# Patient Record
Sex: FEMALE | Race: WHITE | NOT HISPANIC OR LATINO | Employment: FULL TIME | URBAN - METROPOLITAN AREA
[De-identification: names, ages, dates, MRNs, and addresses within clinical notes are randomized per-mention and may not be internally consistent; named-entity substitution may affect disease eponyms.]

---

## 2023-04-08 LAB — GROUP B STREP SCREEN: NORMAL

## 2024-10-18 ENCOUNTER — APPOINTMENT (OUTPATIENT)
Dept: DERMATOLOGY | Facility: CLINIC | Age: 34
End: 2024-10-18
Payer: COMMERCIAL

## 2024-11-19 ENCOUNTER — APPOINTMENT (OUTPATIENT)
Dept: DERMATOLOGY | Facility: CLINIC | Age: 34
End: 2024-11-19
Payer: COMMERCIAL

## 2025-02-03 ENCOUNTER — APPOINTMENT (OUTPATIENT)
Dept: DERMATOLOGY | Facility: CLINIC | Age: 35
End: 2025-02-03
Payer: COMMERCIAL

## 2025-02-18 ENCOUNTER — APPOINTMENT (OUTPATIENT)
Dept: OBSTETRICS AND GYNECOLOGY | Facility: CLINIC | Age: 35
End: 2025-02-18
Payer: COMMERCIAL

## 2025-02-18 VITALS — WEIGHT: 135 LBS | BODY MASS INDEX: 22.47 KG/M2 | SYSTOLIC BLOOD PRESSURE: 108 MMHG | DIASTOLIC BLOOD PRESSURE: 62 MMHG

## 2025-02-18 DIAGNOSIS — Z34.81 PRENATAL CARE, SUBSEQUENT PREGNANCY IN FIRST TRIMESTER (HHS-HCC): Primary | ICD-10-CM

## 2025-02-18 DIAGNOSIS — Z3A.01 6 WEEKS GESTATION OF PREGNANCY (HHS-HCC): ICD-10-CM

## 2025-02-18 PROBLEM — Z87.898 HISTORY OF POOR FETAL GROWTH: Status: ACTIVE | Noted: 2025-02-18

## 2025-02-18 PROBLEM — Z86.11 HISTORY OF TREATMENT FOR TUBERCULOSIS: Status: ACTIVE | Noted: 2025-02-18

## 2025-02-18 LAB — PREGNANCY TEST URINE, POC: POSITIVE

## 2025-02-18 PROCEDURE — 81025 URINE PREGNANCY TEST: CPT

## 2025-02-18 PROCEDURE — 0500F INITIAL PRENATAL CARE VISIT: CPT

## 2025-02-18 RX ORDER — CLINDAMYCIN PHOSPHATE 10 UG/ML
LOTION TOPICAL
COMMUNITY
Start: 2017-09-12 | End: 2025-09-25

## 2025-02-18 RX ORDER — ADAPALENE AND BENZOYL PEROXIDE GEL, 0.1%/2.5% 1; 25 MG/G; MG/G
1 GEL TOPICAL
COMMUNITY
Start: 2020-11-18

## 2025-02-18 ASSESSMENT — EDINBURGH POSTNATAL DEPRESSION SCALE (EPDS)
THE THOUGHT OF HARMING MYSELF HAS OCCURRED TO ME: NEVER
I HAVE BEEN ANXIOUS OR WORRIED FOR NO GOOD REASON: YES, SOMETIMES
I HAVE BEEN SO UNHAPPY THAT I HAVE HAD DIFFICULTY SLEEPING: NOT AT ALL
I HAVE FELT SCARED OR PANICKY FOR NO GOOD REASON: NO, NOT AT ALL
I HAVE BEEN ABLE TO LAUGH AND SEE THE FUNNY SIDE OF THINGS: AS MUCH AS I ALWAYS COULD
I HAVE LOOKED FORWARD WITH ENJOYMENT TO THINGS: AS MUCH AS I EVER DID
TOTAL SCORE: 7
I HAVE FELT SAD OR MISERABLE: NOT VERY OFTEN
I HAVE BEEN SO UNHAPPY THAT I HAVE BEEN CRYING: ONLY OCCASIONALLY
I HAVE BLAMED MYSELF UNNECESSARILY WHEN THINGS WENT WRONG: NOT VERY OFTEN
THINGS HAVE BEEN GETTING ON TOP OF ME: YES, SOMETIMES I HAVEN'T BEEN COPING AS WELL AS USUAL

## 2025-02-18 NOTE — PROGRESS NOTES
"Assessment/Plan     Routine Prenatal Care  - Patient appropriate for and desires midwifery service  - Oriented to practice, including available collaboration and consulting with physicians. Pt will likely split prenatal care between  office and either [place] or [facility] as she lives in [place].  - Discussed routine OB labs, including serum STI/HIV, CBC, blood type and screen.   - Education provided r/t nutrition, folic acid supplementation, dietary guidelines, exercise, travel, smoking, alcohol, caffeine, and drug use.  - Dating ultrasound ordered, pt undecided if she will schedule or defer to NT.  - Pt counseled on genetic testing options and provided literature in the obstetric folder. First line screening options, including cffDNA and NT ultrasound, were discussed and offered.  Benefits, drawbacks, and limitations explained. Pt interested in both - orders placed.  - Warning s/s discussed and SAB precautions reviewed. Pt provided on-call number and instructed on when to call.    Nausea During Pregnancy  - Discussed lifestyle modifications including small frequent meals, keeping dry crackers at bedside to eat upon awakening  - Discussed complementary treatments including peppermint and ginger products   - Recommended 50mg Vitamin B6 TID, and/or 12.5mg Unisom nightly. Pt opts to purchase OTC, if needed.  - Pt declines antiemetics to be sent to preferred pharmacy    Pre-pregnancy BMI 22.13   - Normal weight in Pregnancy - BMI 18.5-24.9; weight gain of 25-35lbs through healthy eating habits reviewed. Recommended \"Real Food for Pregnancy\" by Latha Reynolds.    ASA Prophylaxis  - Discussed universal prophylaxis to be initiated between 12-13 weeks, advised that this may be helpful in reducing risk of FGR    Health Maintenance  - counseled on seatbelt use, continued bi-annual visits for dental care, and annual visits with PCP    Cervical Cancer Screening  - PAP up to date  - ASCCP guidelines reviewed with patient; next PAP " due 2027    Remainder of problem list as addressed below:    Pregnancy Problems (from 25 to present)       Problem Noted Diagnosed Resolved    6 weeks gestation of pregnancy ([facility]) 2025 by JANIS Beavers  No    Priority:  Medium       Overview Addendum 2025 11:32 AM by JANIS Beavers     Desired provider in labor: [x] CNM  [] Physician   [] Either Acceptable  [x] Blood Products: [x] Yes, accepts [] No, needs counseling  [x] Initial BMI: 22.13   [] Prenatal Labs:   [x] Cervical Cancer Screening up to date: due 2027  [] Rh status:   [] Screen for IPV and Substance Use Risk:  [] Genetic Screening (cfDNA):    [] First Trimester Anatomy Screen (11-13.6 wks):  [] Baby ASA (initiated):  [] Pregnancy dated by:     [] Anatomy US: (19-20 wks)  [] Federal Sterilization consent signed (if indicated):  [] 1hr GCT at 24-28wks:  [] Rhogam (if indicated):   [] Fetal Surveillance (if indicated):  [] Tdap (27-32 wks, may be given up to 36 wks if initial window missed):   [] RSV (32-36 wks) (Sept. to end of ):     [] Feeding Intentions:  [] Postpartum Birth control method:   [] GBS at 36 - 37 wks:  [] 39 weeks discussion of IOL vs. Expectant management:  [] Mode of delivery ( anticipated ):           History of treatment for tuberculosis 2025 by JANIS Beavers  No    Priority:  Medium       Overview Addendum 2025 11:35 AM by JANIS Beavers      or 2016 per pt, was treated with Rifampin  CXR 2017 negative               F/U 4 weeks. Serum NOB with NIPT labs at next visit. Review dating US at next visit, if completed.     JANIS Beavers    Tang Biswas is a 34 y.o.  at 6w1d with a working estimated date of delivery of 10/13/2025, by Last Menstrual Period who presents for an initial prenatal visit. This pregnancy is planned.     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, previously pediatric  NP    Patient currently experiencing: Nauseous     LMP: certain 25; Prior menstrual cycle regular q 28 days; had 4-5 menses after stopping nursing  Bleeding since LMP: no  Taking prenatal vitamin: Yes  Ultrasound completed this pregnancy: No    OB History    Para Term  AB Living   4 2 2 0 1 2   SAB IAB Ectopic Multiple Live Births   1 0 0 0 2      # Outcome Date GA Lbr Demetrio/2nd Weight Sex Type Anes PTL Lv   4 Current            3 Term 23 38w2d  2.892 kg M Vag-Spont EPI N DEJAH      Complications: Fetal growth restriction antepartum ([facility]HS-HCC)   2 Term 21   2.778 kg F Vag-Spont   DEJAH   1 SAB 2020                Prior pregnancy complications: fetal growth restriction    Preeclampsia Risk  Moderate risk factors include: None. High risk factors include: None.    PAP History: last PAP 22 nml, HPV neg  Pt denies known history of abnormals     Past Medical History:   Diagnosis Date    Personal history of diseases of the skin and subcutaneous tissue 2014    History of acne    Tension-type headache, unspecified, not intractable 10/31/2014    Tension type headache    Tuberculosis     Remote history, treated with Rifampin in  or ; Neg CXR thereafer      Past Surgical History:   Procedure Laterality Date    MOUTH SURGERY  2014    Oral Surgery Tooth Extraction      Social History     Tobacco Use    Smoking status: Never    Smokeless tobacco: Never   Vaping Use    Vaping status: Never Used   Substance Use Topics    Alcohol use: Not Currently    Drug use: Never      Current Medications: Stopped tretinoin prior to pregnancy, still takes clindamycin cream, PNV    Objective     /62   Wt 61.2 kg (135 lb)   LMP 2025   BMI 22.47 kg/m²     Physical Exam  Vitals reviewed.   Constitutional:       General: She is not in acute distress.     Appearance: Normal appearance.   HENT:      Head: Normocephalic and atraumatic.   Cardiovascular:      Rate and Rhythm: Normal rate and  regular rhythm.      Heart sounds: Normal heart sounds, S1 normal and S2 normal.   Pulmonary:      Effort: Pulmonary effort is normal.      Breath sounds: Normal breath sounds.   Abdominal:      General: Abdomen is flat.      Palpations: Abdomen is soft.      Tenderness: There is no abdominal tenderness.      Comments: Gravid uterus   Musculoskeletal:         General: Normal range of motion.      Cervical back: Normal range of motion.   Skin:     General: Skin is warm and dry.   Neurological:      Mental Status: She is alert and oriented to person, place, and time.   Psychiatric:         Mood and Affect: Mood normal.         Behavior: Behavior normal.         Thought Content: Thought content normal.         Judgment: Judgment normal.          Postpartum Depression: Not on file

## 2025-02-20 LAB
BACTERIA UR CULT: NORMAL
C TRACH RRNA SPEC QL NAA+PROBE: NOT DETECTED
N GONORRHOEA RRNA SPEC QL NAA+PROBE: NOT DETECTED
QUEST GC CT AMPLIFIED (ALWAYS MESSAGE): NORMAL
T VAGINALIS RRNA SPEC QL NAA+PROBE: NOT DETECTED

## 2025-03-13 ENCOUNTER — TELEPHONE (OUTPATIENT)
Dept: OBSTETRICS AND GYNECOLOGY | Facility: CLINIC | Age: 35
End: 2025-03-13
Payer: COMMERCIAL

## 2025-03-13 ENCOUNTER — TELEPHONE (OUTPATIENT)
Dept: OBSTETRICS AND GYNECOLOGY | Facility: HOSPITAL | Age: 35
End: 2025-03-13
Payer: COMMERCIAL

## 2025-03-13 NOTE — TELEPHONE ENCOUNTER
Pt contacted to follow up the [employer] message she sent us.  Kids recently sick.  Now she got it.  C/o vomitting last night since 2am.    Pt encouraged to drink clear fluids for next 24 hrs. take frequent sips all day.  Has been holding down ginger ale for last 15min.  S/s dehydration discussed. need for iv hydration reviewed.  Understanding voiced.

## 2025-03-14 ENCOUNTER — TELEPHONE (OUTPATIENT)
Dept: OBSTETRICS AND GYNECOLOGY | Facility: CLINIC | Age: 35
End: 2025-03-14
Payer: COMMERCIAL

## 2025-03-14 NOTE — TELEPHONE ENCOUNTER
Received call from patient via answering service. Patient is a 33 yo  at 9.3 wks who was seen today in ED for N/V. She states she was treated with IV fluids and Zofran. Patient reports since she returned home (~ 2 hrs ago) she has started having lower abdominal and back pain. Denies VB. Patient states she was offered US in ED, but declined. She states she is worried about pregnancy now that she is having cramping.     Discussed possible causes for cramping. Warning signs reviewed. Will message [place] office for possible follow up appt tomorrow with AALIYAH. Patient verbalized understanding.    LV Conner-AALIYAH

## 2025-03-14 NOTE — TELEPHONE ENCOUNTER
----- Message from Erica Eastman sent at 3/13/2025  9:35 PM EDT -----  Regarding: Add on for ED follow up?  Gm Medina,  I spoke to this patient tonight. Seen in the ED for N/V, declined US in ED. Now having cramping and lower back pain. Warning signs reviewed, but can we have her seen in the office tomorrow for follow-up?They can decide if an US should be scheduled sooner. Patient not having VB. I discussed likely from N/V and dehydration.    Thanks!  Erica

## 2025-03-14 NOTE — TELEPHONE ENCOUNTER
Attempted to reach pt by phone.    Got her voice mail.    Message left to call office.  RE:check to see how pt is feeling today.

## 2025-03-14 NOTE — TELEPHONE ENCOUNTER
----- Message from Nurse Randi GABRIEL sent at 3/13/2025  3:10 PM EDT -----    ----- Message -----  From: Randi Resendiz LPN  Sent: 3/13/2025   3:10 PM EDT  To: Carol Sosa LPN    Pt went to [facility] 3/13/25. See [facility] message and follow up with pt.

## 2025-03-17 ENCOUNTER — APPOINTMENT (OUTPATIENT)
Dept: RADIOLOGY | Facility: CLINIC | Age: 35
End: 2025-03-17
Payer: COMMERCIAL

## 2025-03-18 ENCOUNTER — APPOINTMENT (OUTPATIENT)
Dept: OBSTETRICS AND GYNECOLOGY | Facility: CLINIC | Age: 35
End: 2025-03-18
Payer: COMMERCIAL

## 2025-03-24 ENCOUNTER — LAB (OUTPATIENT)
Dept: LAB | Facility: HOSPITAL | Age: 35
End: 2025-03-24
Payer: COMMERCIAL

## 2025-03-24 LAB
ABO GROUP (TYPE) IN BLOOD: NORMAL
ANTIBODY SCREEN: NORMAL
ERYTHROCYTE [DISTWIDTH] IN BLOOD BY AUTOMATED COUNT: 11.9 % (ref 11.5–14.5)
HCT VFR BLD AUTO: 38.2 % (ref 36–46)
HGB BLD-MCNC: 13.1 G/DL (ref 12–16)
MCH RBC QN AUTO: 30.3 PG (ref 26–34)
MCHC RBC AUTO-ENTMCNC: 34.3 G/DL (ref 32–36)
MCV RBC AUTO: 88 FL (ref 80–100)
NRBC BLD-RTO: 0 /100 WBCS (ref 0–0)
PLATELET # BLD AUTO: 267 X10*3/UL (ref 150–450)
RBC # BLD AUTO: 4.32 X10*6/UL (ref 4–5.2)
REFLEX ADDED, ANEMIA PANEL: NORMAL
RH FACTOR (ANTIGEN D): NORMAL
WBC # BLD AUTO: 6.3 X10*3/UL (ref 4.4–11.3)

## 2025-03-24 PROCEDURE — 86850 RBC ANTIBODY SCREEN: CPT

## 2025-03-24 PROCEDURE — 86901 BLOOD TYPING SEROLOGIC RH(D): CPT

## 2025-03-24 PROCEDURE — 86900 BLOOD TYPING SEROLOGIC ABO: CPT

## 2025-03-24 PROCEDURE — 85027 COMPLETE CBC AUTOMATED: CPT

## 2025-03-24 PROCEDURE — 83021 HEMOGLOBIN CHROMOTOGRAPHY: CPT

## 2025-03-25 ENCOUNTER — APPOINTMENT (OUTPATIENT)
Dept: OBSTETRICS AND GYNECOLOGY | Facility: CLINIC | Age: 35
End: 2025-03-25
Payer: COMMERCIAL

## 2025-03-25 VITALS — SYSTOLIC BLOOD PRESSURE: 116 MMHG | DIASTOLIC BLOOD PRESSURE: 60 MMHG | BODY MASS INDEX: 22.47 KG/M2 | WEIGHT: 135 LBS

## 2025-03-25 DIAGNOSIS — Z3A.11 11 WEEKS GESTATION OF PREGNANCY (HHS-HCC): Primary | ICD-10-CM

## 2025-03-25 DIAGNOSIS — Z34.81 PRENATAL CARE, SUBSEQUENT PREGNANCY IN FIRST TRIMESTER: ICD-10-CM

## 2025-03-25 LAB
HEMOGLOBIN A2: 3 % (ref 2–3.5)
HEMOGLOBIN A: 96.7 % (ref 95.8–98)
HEMOGLOBIN F: 0.3 % (ref 0–2)
HEMOGLOBIN IDENTIFICATION INTERPRETATION: NORMAL
PATH REVIEW-HGB IDENTIFICATION: NORMAL

## 2025-03-25 PROCEDURE — 0501F PRENATAL FLOW SHEET: CPT

## 2025-03-25 NOTE — PROGRESS NOTES
Assessment/Plan   34 y.o.  at 11w1d  - serum NOB labs collected yesterday; reviewed with pt results thus far are wnl, others still pending  - pt still declining antiemetics; feeling better  - NT scheduled for   - Routine prenatal care    Follow up in 4 weeks for next prenatal visit. Review results of cfDNA and NT scan.    JANIS Beavers    Tang Biswas is a 34 y.o.  at 11w1d with a working estimated date of delivery of 10/13/2025, by Last Menstrual Period presenting for a routine prenatal visit. She is doing well, no concerns. She denies vaginal bleeding, leakage of fluid, or cramping.    Was seen 3/13 in ED for n/v, lower abd and back pain.  She had a nml UA, felt better after IV fluid bolus, and declined Rx for antiemetics upon discharge. She did not have an US performed in ED and has yet to have one thus far this pregnancy.     Pt states she thinks n/v was due to viral illness going around her house, not necessarily n/v due to pregnancy. Feeling much better now. Visited her parents in [place] last week, had a great trip.    Her pregnancy is complicated by:  Pregnancy Problems (from 25 to present)       Problem Noted Diagnosed Resolved    11 weeks gestation of pregnancy ([facility]) 2025 by JANIS Beavers  No    Priority:  Medium       Overview Addendum 2025 11:32 AM by JANIS Beavers     Desired provider in labor: [x] CNM  [] Physician   [] Either Acceptable  [x] Blood Products: [x] Yes, accepts [] No, needs counseling  [x] Initial BMI: 22.13   [] Prenatal Labs:   [x] Cervical Cancer Screening up to date: due 2027  [] Rh status:   [] Screen for IPV and Substance Use Risk:  [] Genetic Screening (cfDNA):    [] First Trimester Anatomy Screen (11-13.6 wks):  [] Baby ASA (initiated):  [] Pregnancy dated by:     [] Anatomy US: (19-20 wks)  [] Federal Sterilization consent signed (if indicated):  [] 1hr GCT at 24-28wks:  [] Rhogam (if  indicated):   [] Fetal Surveillance (if indicated):  [] Tdap (27-32 wks, may be given up to 36 wks if initial window missed):   [] RSV (32-36 wks) (Sept. to end ):     [] Feeding Intentions:  [] Postpartum Birth control method:   [] GBS at 36 - 37 wks:  [] 39 weeks discussion of IOL vs. Expectant management:  [] Mode of delivery ( anticipated ):           History of treatment for tuberculosis 2025 by JANIS Beavers  No    Priority:  Medium       Overview Addendum 2025 11:35 AM by JANIS Beavers     2015 or 2016 per pt, was treated with Rifampin  CXR 2017 negative                  Objective   Weight: 61.2 kg (135 lb)  TW.907 kg (2 lb)   Expected Total Weight Gain: 11.5 kg (25 lb)-16 kg (35 lb)   Pregravid BMI: 22.13  Pregravid Weight: 60.3 kg (133 lb)   BP: 116/60  Fetal Heart Rate: 160

## 2025-03-27 LAB
EST. AVERAGE GLUCOSE BLD GHB EST-MCNC: 105 MG/DL
EST. AVERAGE GLUCOSE BLD GHB EST-SCNC: 5.8 MMOL/L
HBA1C MFR BLD: 5.3 % OF TOTAL HGB
HBV SURFACE AG SERPL QL IA: NORMAL
HCV AB SERPL QL IA: NORMAL
HIV 1+2 AB+HIV1 P24 AG SERPL QL IA: NORMAL
RUBV IGG SERPL IA-ACNC: 27.2 INDEX
T PALLIDUM AB SER QL IA: NEGATIVE

## 2025-04-01 LAB
COMMENTS - MP RESULT TYPE: NORMAL
SCAN RESULT: NORMAL

## 2025-04-04 ENCOUNTER — APPOINTMENT (OUTPATIENT)
Dept: RADIOLOGY | Facility: CLINIC | Age: 35
End: 2025-04-04
Payer: COMMERCIAL

## 2025-04-04 ENCOUNTER — HOSPITAL ENCOUNTER (OUTPATIENT)
Dept: RADIOLOGY | Facility: CLINIC | Age: 35
Discharge: HOME | End: 2025-04-04
Payer: COMMERCIAL

## 2025-04-04 DIAGNOSIS — Z34.81 PRENATAL CARE, SUBSEQUENT PREGNANCY IN FIRST TRIMESTER: ICD-10-CM

## 2025-04-04 PROCEDURE — 76813 OB US NUCHAL MEAS 1 GEST: CPT

## 2025-04-15 ENCOUNTER — APPOINTMENT (OUTPATIENT)
Dept: OBSTETRICS AND GYNECOLOGY | Facility: CLINIC | Age: 35
End: 2025-04-15
Payer: COMMERCIAL

## 2025-04-22 ENCOUNTER — APPOINTMENT (OUTPATIENT)
Dept: OBSTETRICS AND GYNECOLOGY | Facility: CLINIC | Age: 35
End: 2025-04-22
Payer: COMMERCIAL

## 2025-04-22 VITALS — DIASTOLIC BLOOD PRESSURE: 64 MMHG | BODY MASS INDEX: 23.13 KG/M2 | SYSTOLIC BLOOD PRESSURE: 106 MMHG | WEIGHT: 139 LBS

## 2025-04-22 DIAGNOSIS — Z34.82 PRENATAL CARE, SUBSEQUENT PREGNANCY IN SECOND TRIMESTER: ICD-10-CM

## 2025-04-22 DIAGNOSIS — Z3A.15 15 WEEKS GESTATION OF PREGNANCY (HHS-HCC): Primary | ICD-10-CM

## 2025-04-22 PROCEDURE — 0501F PRENATAL FLOW SHEET: CPT

## 2025-04-22 NOTE — PROGRESS NOTES
Assessment/Plan   34 y.o.  at 15w1d  - Reviewed results of NT scan, wnl and c/w menstrual dating  - anatomy scan scheduled for   - discussed recommendation for universal bASA prophylaxis, pt declines  - Routine prenatal care    Follow up in 4 weeks for next prenatal visit. How was Marti and her brother's engagement?    JANIS Beavers    Tang Biswas is a 34 y.o.  at 15w1d with a working estimated date of delivery of 10/13/2025, by Last Menstrual Period presenting for a routine prenatal visit. She is doing well, no concerns. She denies vaginal bleeding, leakage of fluid, contractions, or decreased fetal movement.    Nausea resolved, just fatigued sometimes.   Going to [place] to visit family this month. Her brother is going to propose to his girlfriend.     Her pregnancy is complicated by:  Pregnancy Problems (from 25 to present)       Problem Noted Diagnosed Resolved    15 weeks gestation of pregnancy ([facility]) 2025 by JANIS Beavers  No    Priority:  Medium       Overview Addendum 2025  9:43 AM by JANIS Beavers   Desired provider in labor: [x] CNM  [] Physician   [] Either Acceptable  [x] Blood Products: [x] Yes, accepts [] No, needs counseling  [x] Initial BMI: 22.13   [x] Prenatal Labs: wnl  [x] Cervical Cancer Screening up to date: due 2027  [x] Rh status: Pos  [] Screen for IPV and Substance Use Risk:  [x] Genetic Screening (cfDNA): nml, girl  [x] First Trimester Anatomy Screen (11-13.6 wks): 1.3mm wnl  [] Baby ASA (initiated):  [] Pregnancy dated by:     [] Anatomy US: (19-20 wks)  [] Federal Sterilization consent signed (if indicated):  [] 1hr GCT at 24-28wks:  [] Rhogam (if indicated):   [] Fetal Surveillance (if indicated):  [] Tdap (27-32 wks, may be given up to 36 wks if initial window missed):   [] RSV (32-36 wks) (Sept. to end of [place]):     [] Feeding Intentions:  [] Postpartum Birth control method:   [] GBS at 36 -  37 wks:  [] 39 weeks discussion of IOL vs. Expectant management:  [] Mode of delivery ( anticipated ):           History of treatment for tuberculosis 2025 by JANIS Beavers  No    Priority:  Medium       Overview Addendum 2025 11:35 AM by JANIS Beavers    or 2016 per pt, was treated with Rifampin  CXR 2017 negative                  Objective   Weight: 63 kg (139 lb)  TW.722 kg (6 lb)   Expected Total Weight Gain: 11.5 kg (25 lb)-16 kg (35 lb)   Pregravid BMI: 22.13  Pregravid Weight: 60.3 kg (133 lb)   BP: 106/64  Fetal Heart Rate: 150

## 2025-05-02 ENCOUNTER — TELEPHONE (OUTPATIENT)
Dept: OBSTETRICS AND GYNECOLOGY | Facility: CLINIC | Age: 35
End: 2025-05-02
Payer: COMMERCIAL

## 2025-05-02 NOTE — TELEPHONE ENCOUNTER
Pt contacted.     Pt c/o HA past 2 days.   Denies visual changes or abd pain.  Pt sounds congested.   ? Seasonal allergies.  Pt reports has not been drinking enough water.  Pt has only taken 2 ES tylenol twice in last 2 days.  Pt encouraged to take more often.   Will also try Claritin or sudafed.  Understanding voiced.

## 2025-05-09 ENCOUNTER — TELEPHONE (OUTPATIENT)
Dept: OBSTETRICS AND GYNECOLOGY | Facility: CLINIC | Age: 35
End: 2025-05-09

## 2025-05-09 ENCOUNTER — TELEPHONE (OUTPATIENT)
Dept: OBSTETRICS AND GYNECOLOGY | Facility: CLINIC | Age: 35
End: 2025-05-09
Payer: COMMERCIAL

## 2025-05-09 NOTE — TELEPHONE ENCOUNTER
ADVISED PT TO COME IN MONDAY MORNING AND I WILL DOPPLER HER TO REASSURE HER BEFORE SHE GOES OUT OF TOWN FOR 2 WEEKS

## 2025-05-27 ENCOUNTER — APPOINTMENT (OUTPATIENT)
Dept: OBSTETRICS AND GYNECOLOGY | Facility: CLINIC | Age: 35
End: 2025-05-27
Payer: COMMERCIAL

## 2025-05-27 VITALS — DIASTOLIC BLOOD PRESSURE: 68 MMHG | WEIGHT: 142 LBS | SYSTOLIC BLOOD PRESSURE: 112 MMHG | BODY MASS INDEX: 23.63 KG/M2

## 2025-05-27 DIAGNOSIS — Z34.82 PRENATAL CARE, SUBSEQUENT PREGNANCY IN SECOND TRIMESTER: ICD-10-CM

## 2025-05-27 DIAGNOSIS — Z3A.20 20 WEEKS GESTATION OF PREGNANCY (HHS-HCC): Primary | ICD-10-CM

## 2025-05-27 PROCEDURE — 0501F PRENATAL FLOW SHEET: CPT

## 2025-05-27 NOTE — PROGRESS NOTES
"Assessment/Plan   34 y.o.  at 20w1d  - anatomy scan scheduled for   - discussed upcoming GCT/CBC to be performed between 24-28 weeks, all questions answered. Orders placed.   - Routine prenatal care    Follow up in 4 weeks for next prenatal visit. Review anatomy scan. Encourage completion of GCT/CBC.    JANIS Beavers    Tang Biswas is a 34 y.o.  at 20w1d with a working estimated date of delivery of 10/13/2025, by Last Menstrual Period presenting for a routine prenatal visit. She is doing well, no concerns. She denies vaginal bleeding, leakage of fluid, contractions, or decreased fetal movement.    Had a good trip to [place]! No issues while traveling, \"didn't even feel pregnant\". Now feeling FM.    Her pregnancy is complicated by:  Pregnancy Problems (from 25 to present)       Problem Noted Diagnosed Resolved    20 weeks gestation of pregnancy ([facility]) 2025 by JANIS Beavers  No    Priority:  Medium       Overview Addendum 2025  9:52 AM by JANIS Beavers   Desired provider in labor: [x] CNM  [] Physician   [] Either Acceptable  [x] Blood Products: [x] Yes, accepts [] No, needs counseling  [x] Initial BMI: 22.13   [x] Prenatal Labs: wnl  [x] Cervical Cancer Screening up to date: due 2027  [x] Rh status: Pos  [x] Screen for IPV and Substance Use Risk: Pt denies both  [x] Genetic Screening (cfDNA): nml, girl  [x] First Trimester Anatomy Screen (11-13.6 wks): 1.3mm wnl  [x] Baby ASA (initiated): offered at 15 weeks, pt declines  [x] Pregnancy dated by: 12w scan = LMP    [] Anatomy US: (19-20 wks)  [] Federal Sterilization consent signed (if indicated):  [] 1hr GCT at 24-28wks:  [] Rhogam (if indicated):   [] Fetal Surveillance (if indicated):  [] Tdap (27-32 wks, may be given up to 36 wks if initial window missed):   [] RSV (32-36 wks) (Sept. to end of ):     [] Feeding Intentions:  [x] Postpartum Birth control method: Planning " Partner vasectomy  [] GBS at 36 - 37 wks:  [] 39 weeks discussion of IOL vs. Expectant management:  [] Mode of delivery ( anticipated ):           History of treatment for tuberculosis 2025 by JANIS Beavers  No    Priority:  Medium       Overview Addendum 2025 11:35 AM by JANIS Beavers    or 2016 per pt, was treated with Rifampin  CXR 2017 negative                  Objective   Weight: 64.4 kg (142 lb)  TW.082 kg (9 lb)   Expected Total Weight Gain: 11.5 kg (25 lb)-16 kg (35 lb)   Pregravid BMI: 22.13  Pregravid Weight: 60.3 kg (133 lb)   BP: 112/68  Fetal Heart Rate: 145 Fundal Height (cm): 20 cm

## 2025-05-30 ENCOUNTER — HOSPITAL ENCOUNTER (OUTPATIENT)
Dept: RADIOLOGY | Facility: CLINIC | Age: 35
Discharge: HOME | End: 2025-05-30
Payer: COMMERCIAL

## 2025-05-30 DIAGNOSIS — Z34.81 PRENATAL CARE, SUBSEQUENT PREGNANCY IN FIRST TRIMESTER: ICD-10-CM

## 2025-05-30 PROCEDURE — 76805 OB US >/= 14 WKS SNGL FETUS: CPT

## 2025-06-24 ENCOUNTER — APPOINTMENT (OUTPATIENT)
Dept: OBSTETRICS AND GYNECOLOGY | Facility: CLINIC | Age: 35
End: 2025-06-24
Payer: COMMERCIAL

## 2025-06-24 ENCOUNTER — APPOINTMENT (OUTPATIENT)
Dept: LAB | Facility: HOSPITAL | Age: 35
End: 2025-06-24
Payer: COMMERCIAL

## 2025-06-24 VITALS — WEIGHT: 145 LBS | DIASTOLIC BLOOD PRESSURE: 60 MMHG | SYSTOLIC BLOOD PRESSURE: 102 MMHG | BODY MASS INDEX: 24.13 KG/M2

## 2025-06-24 DIAGNOSIS — Z34.82 PRENATAL CARE, SUBSEQUENT PREGNANCY IN SECOND TRIMESTER: Primary | ICD-10-CM

## 2025-06-24 DIAGNOSIS — Z3A.24 24 WEEKS GESTATION OF PREGNANCY (HHS-HCC): ICD-10-CM

## 2025-06-24 LAB
ERYTHROCYTE [DISTWIDTH] IN BLOOD BY AUTOMATED COUNT: 12.9 % (ref 11.5–14.5)
HCT VFR BLD AUTO: 33.9 % (ref 36–46)
HGB BLD-MCNC: 11.6 G/DL (ref 12–16)
MCH RBC QN AUTO: 31.4 PG (ref 26–34)
MCHC RBC AUTO-ENTMCNC: 34.2 G/DL (ref 32–36)
MCV RBC AUTO: 92 FL (ref 80–100)
NRBC BLD-RTO: 0 /100 WBCS (ref 0–0)
PLATELET # BLD AUTO: 194 X10*3/UL (ref 150–450)
RBC # BLD AUTO: 3.7 X10*6/UL (ref 4–5.2)
REFLEX ADDED, ANEMIA PANEL: NORMAL
WBC # BLD AUTO: 6.2 X10*3/UL (ref 4.4–11.3)

## 2025-06-24 PROCEDURE — 0501F PRENATAL FLOW SHEET: CPT

## 2025-06-24 PROCEDURE — 85027 COMPLETE CBC AUTOMATED: CPT

## 2025-06-24 NOTE — PROGRESS NOTES
Assessment/Plan   34 y.o.  at 24w1d  - GCT/CBC completed today, results pending  - Discussed Tdap to be offered at next visit  - Routine prenatal care    Follow up in 4 weeks for next prenatal visit    JANIS Beavers    Subjective     Carlene Biswas is a 34 y.o.  at 24w1d with a working estimated date of delivery of 10/13/2025, by Last Menstrual Period presenting for a routine prenatal visit. She is doing well, no concerns. She denies vaginal bleeding, leakage of fluid, contractions, or decreased fetal movement.    Her pregnancy is complicated by:  Pregnancy Problems (from 25 to present)       Problem Noted Diagnosed Resolved    24 weeks gestation of pregnancy ([facility]) 2025 by JANIS Beavers  No    Priority:  Medium       Overview Addendum 2025  9:40 AM by JANIS Beavers   Desired provider in labor: [x] CNM  [] Physician   [] Either Acceptable  [x] Blood Products: [x] Yes, accepts [] No, needs counseling  [x] Initial BMI: 22.13   [x] Prenatal Labs: wnl  [x] Cervical Cancer Screening up to date: due 2027  [x] Rh status: Pos  [x] Screen for IPV and Substance Use Risk: Pt denies both  [x] Genetic Screening (cfDNA): nml, girl  [x] First Trimester Anatomy Screen (11-13.6 wks): 1.3mm wnl  [x] Baby ASA (initiated): offered at 15 weeks, pt declines  [x] Pregnancy dated by: 12w scan = LMP    [x] Anatomy US: (19-20 wks) WNL  [] Federal Sterilization consent signed (if indicated):  [] 1hr GCT at 24-28wks:  [] Rhogam (if indicated):   [] Fetal Surveillance (if indicated): growth scheduled @ 28 weeks for h/o fhr prior pregnancies  [] Tdap (27-32 wks, may be given up to 36 wks if initial window missed):   [] RSV (32-36 wks) (Sept. to end of ):     [] Feeding Intentions:  [x] Postpartum Birth control method: Planning Partner vasectomy  [] GBS at 36 - 37 wks:  [] 39 weeks discussion of IOL vs. Expectant management:  [] Mode of delivery ( anticipated ):            History of treatment for tuberculosis 2025 by JANIS Beavres  No    Priority:  Medium       Overview Addendum 2025 11:35 AM by JANIS Beavers    or 2016 per pt, was treated with Rifampin  CXR 2017 negative                  Objective   Weight: 65.8 kg (145 lb)  TW.443 kg (12 lb)   Expected Total Weight Gain: 11.5 kg (25 lb)-16 kg (35 lb)   Pregravid BMI: 22.13  Pregravid Weight: 60.3 kg (133 lb)   BP: 102/60  Fetal Heart Rate: 146 Fundal Height (cm): 23 cm

## 2025-06-27 LAB
GLUCOSE 1H P 50 G GLC PO SERPL-MCNC: 130 MG/DL
T PALLIDUM AB SER QL IA: NEGATIVE

## 2025-07-22 ENCOUNTER — APPOINTMENT (OUTPATIENT)
Dept: OBSTETRICS AND GYNECOLOGY | Facility: CLINIC | Age: 35
End: 2025-07-22
Payer: COMMERCIAL

## 2025-07-22 VITALS — WEIGHT: 147 LBS | BODY MASS INDEX: 24.46 KG/M2 | SYSTOLIC BLOOD PRESSURE: 106 MMHG | DIASTOLIC BLOOD PRESSURE: 52 MMHG

## 2025-07-22 DIAGNOSIS — Z23 NEED FOR TDAP VACCINATION: ICD-10-CM

## 2025-07-22 DIAGNOSIS — Z34.83 PRENATAL CARE, SUBSEQUENT PREGNANCY IN THIRD TRIMESTER: ICD-10-CM

## 2025-07-22 DIAGNOSIS — Z71.85 VACCINE COUNSELING: ICD-10-CM

## 2025-07-22 DIAGNOSIS — Z3A.28 28 WEEKS GESTATION OF PREGNANCY (HHS-HCC): Primary | ICD-10-CM

## 2025-07-22 PROCEDURE — 90715 TDAP VACCINE 7 YRS/> IM: CPT

## 2025-07-22 PROCEDURE — 90471 IMMUNIZATION ADMIN: CPT

## 2025-07-22 PROCEDURE — 0501F PRENATAL FLOW SHEET: CPT

## 2025-07-22 ASSESSMENT — EDINBURGH POSTNATAL DEPRESSION SCALE (EPDS)
I HAVE BEEN ABLE TO LAUGH AND SEE THE FUNNY SIDE OF THINGS: AS MUCH AS I ALWAYS COULD
I HAVE FELT SAD OR MISERABLE: NO, NOT AT ALL
I HAVE BLAMED MYSELF UNNECESSARILY WHEN THINGS WENT WRONG: NOT VERY OFTEN
TOTAL SCORE: 3
I HAVE LOOKED FORWARD WITH ENJOYMENT TO THINGS: AS MUCH AS I EVER DID
THE THOUGHT OF HARMING MYSELF HAS OCCURRED TO ME: NEVER
I HAVE BEEN SO UNHAPPY THAT I HAVE BEEN CRYING: NO, NEVER
THINGS HAVE BEEN GETTING ON TOP OF ME: NO, MOST OF THE TIME I HAVE COPED QUITE WELL
I HAVE FELT SCARED OR PANICKY FOR NO GOOD REASON: NO, NOT AT ALL
I HAVE BEEN SO UNHAPPY THAT I HAVE HAD DIFFICULTY SLEEPING: NOT AT ALL
I HAVE BEEN ANXIOUS OR WORRIED FOR NO GOOD REASON: HARDLY EVER

## 2025-07-22 NOTE — PROGRESS NOTES
TDAP injected today  EPDS = 3    Assessment/Plan   34 y.o.  at 28w1d  - reviewed results of GCT/CBC, both wnl  - Counseled on and recommended Tdap today, pt accepts  - Discussed plans for  feeding and reviewed benefits of breastfeeding; Pt plans to breastfeed. Encouraged her to request prescription for breast pump through [employer] or [employer].   - Routine prenatal care    Follow up in 2 weeks for next prenatal visit    JANIS Beavers    Tang Biswas is a 34 y.o.  at 28w1d with a working estimated date of delivery of 10/13/2025, by Last Menstrual Period presenting for a routine prenatal visit. She is doing well, no concerns. She denies vaginal bleeding, leakage of fluid, or regular contractions. She endorses good FM.    Her pregnancy is complicated by:  Pregnancy Problems (from 25 to present)       Problem Noted Diagnosed Resolved    28 weeks gestation of pregnancy ([facility]) 2025 by JANIS Beavers  No    Priority:  Medium       Overview Addendum 2025  8:24 AM by JANIS Beavers   Desired provider in labor: [x] CNM  [] Physician   [] Either Acceptable  [x] Blood Products: [x] Yes, accepts [] No, needs counseling  [x] Initial BMI: 22.13   [x] Prenatal Labs: wnl  [x] Cervical Cancer Screening up to date: due 2027  [x] Rh status: Pos  [x] Screen for IPV and Substance Use Risk: Pt denies both  [x] Genetic Screening (cfDNA): nml, girl  [x] First Trimester Anatomy Screen (11-13.6 wks): 1.3mm wnl  [x] Baby ASA (initiated): offered at 15 weeks, pt declines  [x] Pregnancy dated by: 12w scan = LMP    [x] Anatomy US: (19-20 wks) WNL  [] Federal Sterilization consent signed (if indicated):  [x] 1hr GCT at 24-28wks: 130  [] Rhogam (if indicated):   [] Fetal Surveillance (if indicated): growth scheduled @ 28 weeks for h/o fhr prior pregnancies  [] Tdap (27-32 wks, may be given up to 36 wks if initial window missed):   [] RSV (32-36 wks)  (Sept. to end ):     [] Feeding Intentions:  [x] Postpartum Birth control method: Planning Partner vasectomy  [] GBS at 36 - 37 wks:  [] 39 weeks discussion of IOL vs. Expectant management:  [] Mode of delivery ( anticipated ):           History of treatment for tuberculosis 2025 by JANIS Beavers  No    Priority:  Medium       Overview Addendum 2025 11:35 AM by JANIS Beavers    or 2016 per pt, was treated with Rifampin  CXR 2017 negative                  Objective   Weight: 66.7 kg (147 lb)  TW.35 kg (14 lb)   Expected Total Weight Gain: 11.5 kg (25 lb)-16 kg (35 lb)   Pregravid BMI: 22.13  Pregravid Weight: 60.3 kg (133 lb)   BP: 106/52  Fetal Heart Rate: 145 Fundal Height (cm): 28 cm

## 2025-07-25 ENCOUNTER — HOSPITAL ENCOUNTER (OUTPATIENT)
Dept: RADIOLOGY | Facility: CLINIC | Age: 35
Discharge: HOME | End: 2025-07-25
Payer: COMMERCIAL

## 2025-07-25 DIAGNOSIS — O35.9XX0 MATERNAL CARE FOR (SUSPECTED) FETAL ABNORMALITY AND DAMAGE, UNSPECIFIED, NOT APPLICABLE OR UNSPECIFIED: ICD-10-CM

## 2025-07-25 DIAGNOSIS — Z34.81 PRENATAL CARE, SUBSEQUENT PREGNANCY IN FIRST TRIMESTER: ICD-10-CM

## 2025-07-25 PROCEDURE — 76816 OB US FOLLOW-UP PER FETUS: CPT

## 2025-08-05 ENCOUNTER — APPOINTMENT (OUTPATIENT)
Dept: OBSTETRICS AND GYNECOLOGY | Facility: CLINIC | Age: 35
End: 2025-08-05
Payer: COMMERCIAL

## 2025-08-05 VITALS — BODY MASS INDEX: 24.46 KG/M2 | DIASTOLIC BLOOD PRESSURE: 60 MMHG | WEIGHT: 147 LBS | SYSTOLIC BLOOD PRESSURE: 110 MMHG

## 2025-08-05 DIAGNOSIS — Z34.83 PRENATAL CARE, SUBSEQUENT PREGNANCY IN THIRD TRIMESTER: ICD-10-CM

## 2025-08-05 DIAGNOSIS — Z3A.30 30 WEEKS GESTATION OF PREGNANCY (HHS-HCC): Primary | ICD-10-CM

## 2025-08-05 PROCEDURE — 0501F PRENATAL FLOW SHEET: CPT

## 2025-08-05 RX ORDER — AZELAIC ACID 0.15 G/G
GEL TOPICAL
COMMUNITY
Start: 2025-07-23

## 2025-08-05 NOTE — PROGRESS NOTES
"Assessment/Plan   34 y.o.  at 30w1d  - Growth scan scheduled for   - Offered cervical exam today to assess for cervical thinning/dilation, pt declines. Reviewed signs and symptoms of  labor including more than 6 painful contractions in a 1 hour period, which may be felt in lower abdomen or lower back. Reviewed warning signs including vaginal bleeding, leaking of fluid, and decreased fetal movement. Pt has emergency answering service number and aware of when to call.   - Reviewed comfort measures for pelvic pressure including belly band and/or KT tape  - Reviewed weight gain, +0lbs since last visit, +14lbs overall, slightly below recommendation for this point in pregnancy. Encouraged 3 balanced meals daily, healthy snacks   - Routine prenatal care    Follow up in 2 weeks for next prenatal visit    JANIS Beavers    Tang Biswas is a 34 y.o.  at 30w1d with a working estimated date of delivery of 10/13/2025, by Last Menstrual Period presenting for a routine prenatal visit. She is doing well, no concerns. She denies vaginal bleeding, leakage of fluid, or regular contractions. She endorses good FM.    Felt like baby was really low yesterday, \"right above my vagina\", feeling a little better today.   is out of town this week for work.     Her pregnancy is complicated by:  Pregnancy Problems (from 25 to present)       Problem Noted Diagnosed Resolved    30 weeks gestation of pregnancy ([facility]) 2025 by JANIS Beavers  No    Priority:  Medium       Overview Addendum 2025  9:39 AM by JANIS Beavers   MAC Delivery  Desired provider in labor: [x] CNM  [] Physician   [] Either Acceptable  [x] Blood Products: [x] Yes, accepts [] No, needs counseling  [x] Initial BMI: 22.13   [x] Prenatal Labs: wnl  [x] Cervical Cancer Screening up to date: due 2027  [x] Rh status: Pos  [x] Screen for IPV and Substance Use Risk: Pt denies " both  [x] Genetic Screening (cfDNA): nml, girl  [x] First Trimester Anatomy Screen (11-13.6 wks): 1.3mm wnl  [x] Baby ASA (initiated): offered at 15 weeks, pt declines  [x] Pregnancy dated by: 12w scan = LMP    [x] Anatomy US: (19-20 wks) WNL  [] Federal Sterilization consent signed (if indicated):  [x] 1hr GCT at 24-28wks: 130  [] Fetal Surveillance (if indicated): growth scheduled @ 28 weeks for h/o fhr prior pregnancies  [x] Tdap (27-32 wks, may be given up to 36 wks if initial window missed): 25  [] RSV (32-36 wks) (Sept. to end ):     [x] Feeding Intentions: breast  [x] Postpartum Birth control method: Planning Partner vasectomy  [] GBS at 36 - 37 wks:  [] 39 weeks discussion of IOL vs. Expectant management:  [] Mode of delivery ( anticipated ):           History of treatment for tuberculosis 2025 by JANIS Beavers  No    Priority:  Medium       Overview Addendum 2025 11:35 AM by JANIS Beavers    or 2016 per pt, was treated with Rifampin  CXR 2017 negative                  Objective   Weight: 66.7 kg (147 lb)  TW.35 kg (14 lb)   Expected Total Weight Gain: 11.5 kg (25 lb)-16 kg (35 lb)   Pregravid BMI: 22.13  Pregravid Weight: 60.3 kg (133 lb)   BP: 110/60  Fetal Heart Rate: 148 Fundal Height (cm): 30 cm

## 2025-08-19 ENCOUNTER — APPOINTMENT (OUTPATIENT)
Dept: OBSTETRICS AND GYNECOLOGY | Facility: CLINIC | Age: 35
End: 2025-08-19
Payer: COMMERCIAL

## 2025-08-19 VITALS — WEIGHT: 147 LBS | DIASTOLIC BLOOD PRESSURE: 70 MMHG | SYSTOLIC BLOOD PRESSURE: 118 MMHG | BODY MASS INDEX: 24.46 KG/M2

## 2025-08-19 DIAGNOSIS — Z3A.32 32 WEEKS GESTATION OF PREGNANCY (HHS-HCC): ICD-10-CM

## 2025-08-19 DIAGNOSIS — Z34.83 PRENATAL CARE, SUBSEQUENT PREGNANCY IN THIRD TRIMESTER: Primary | ICD-10-CM

## 2025-08-19 PROCEDURE — 0501F PRENATAL FLOW SHEET: CPT

## 2025-08-22 ENCOUNTER — HOSPITAL ENCOUNTER (OUTPATIENT)
Dept: RADIOLOGY | Facility: CLINIC | Age: 35
Discharge: HOME | End: 2025-08-22
Payer: COMMERCIAL

## 2025-08-22 DIAGNOSIS — O36.5930 MATERNAL CARE FOR OTHER KNOWN OR SUSPECTED POOR FETAL GROWTH, THIRD TRIMESTER, NOT APPLICABLE OR UNSPECIFIED: ICD-10-CM

## 2025-08-22 DIAGNOSIS — Z34.81 PRENATAL CARE, SUBSEQUENT PREGNANCY IN FIRST TRIMESTER: ICD-10-CM

## 2025-08-22 PROCEDURE — 76816 OB US FOLLOW-UP PER FETUS: CPT

## 2025-09-02 ENCOUNTER — APPOINTMENT (OUTPATIENT)
Dept: OBSTETRICS AND GYNECOLOGY | Facility: CLINIC | Age: 35
End: 2025-09-02
Payer: COMMERCIAL

## 2025-09-04 ENCOUNTER — APPOINTMENT (OUTPATIENT)
Facility: CLINIC | Age: 35
End: 2025-09-04
Payer: COMMERCIAL

## 2025-09-04 PROBLEM — Z3A.34 34 WEEKS GESTATION OF PREGNANCY (HHS-HCC): Status: ACTIVE | Noted: 2025-02-18

## 2025-09-16 ENCOUNTER — APPOINTMENT (OUTPATIENT)
Dept: OBSTETRICS AND GYNECOLOGY | Facility: CLINIC | Age: 35
End: 2025-09-16
Payer: COMMERCIAL

## 2025-09-23 ENCOUNTER — APPOINTMENT (OUTPATIENT)
Dept: OBSTETRICS AND GYNECOLOGY | Facility: CLINIC | Age: 35
End: 2025-09-23
Payer: COMMERCIAL

## 2025-09-30 ENCOUNTER — APPOINTMENT (OUTPATIENT)
Dept: OBSTETRICS AND GYNECOLOGY | Facility: CLINIC | Age: 35
End: 2025-09-30
Payer: COMMERCIAL

## 2025-10-07 ENCOUNTER — APPOINTMENT (OUTPATIENT)
Dept: OBSTETRICS AND GYNECOLOGY | Facility: CLINIC | Age: 35
End: 2025-10-07
Payer: COMMERCIAL